# Patient Record
Sex: FEMALE | Race: WHITE | NOT HISPANIC OR LATINO
[De-identification: names, ages, dates, MRNs, and addresses within clinical notes are randomized per-mention and may not be internally consistent; named-entity substitution may affect disease eponyms.]

---

## 2021-10-22 ENCOUNTER — APPOINTMENT (OUTPATIENT)
Dept: BREAST CENTER | Facility: CLINIC | Age: 31
End: 2021-10-22
Payer: MEDICAID

## 2021-10-22 VITALS — BODY MASS INDEX: 19.78 KG/M2 | HEART RATE: 72 BPM | OXYGEN SATURATION: 98 % | HEIGHT: 67 IN | WEIGHT: 126 LBS

## 2021-10-22 DIAGNOSIS — Z78.9 OTHER SPECIFIED HEALTH STATUS: ICD-10-CM

## 2021-10-22 DIAGNOSIS — Z80.3 FAMILY HISTORY OF MALIGNANT NEOPLASM OF BREAST: ICD-10-CM

## 2021-10-22 DIAGNOSIS — Z91.89 OTHER SPECIFIED PERSONAL RISK FACTORS, NOT ELSEWHERE CLASSIFIED: ICD-10-CM

## 2021-10-22 DIAGNOSIS — Z83.3 FAMILY HISTORY OF DIABETES MELLITUS: ICD-10-CM

## 2021-10-22 DIAGNOSIS — Z82.0 FAMILY HISTORY OF EPILEPSY AND OTHER DISEASES OF THE NERVOUS SYSTEM: ICD-10-CM

## 2021-10-22 PROBLEM — Z00.00 ENCOUNTER FOR PREVENTIVE HEALTH EXAMINATION: Status: ACTIVE | Noted: 2021-10-22

## 2021-10-22 PROCEDURE — 99204 OFFICE O/P NEW MOD 45 MIN: CPT

## 2021-10-22 NOTE — HISTORY OF PRESENT ILLNESS
[FreeTextEntry1] : 29 yo female self referred presents for high risk screening and genetic testing. She has not completed any breast imaging. Pt denies palpable masses, skin lesions/changes, nipple discharge, or other breast complaints.\par \par ELAYNE lifetime risk is 37.4%; her mother was diagnosed with breast cancer at age 40 with recurrence at age 60; her maternal grandmother also had breast cancer, dx age unknown. No genetic testing has been completed.

## 2021-10-22 NOTE — PHYSICAL EXAM
[Normocephalic] : normocephalic [Supple] : supple [No Supraclavicular Adenopathy] : no supraclavicular adenopathy [No Thyromegaly] : no thyromegaly [Examined in the supine and seated position] : examined in the supine and seated position [Symmetrical] : symmetrical [No dominant masses] : no dominant masses in right breast  [No dominant masses] : no dominant masses left breast [No Nipple Retraction] : no left nipple retraction [No Nipple Discharge] : no left nipple discharge [No Axillary Lymphadenopathy] : no left axillary lymphadenopathy [No Rashes] : no rashes [No Ulceration] : no ulceration [Breast Nipple Inversion] : nipples not inverted [Breast Nipple Retraction] : nipples not retracted [Breast Nipple Flattening] : nipples not flattened [Breast Nipple Fissures] : nipples not fissured

## 2021-10-22 NOTE — PAST MEDICAL HISTORY
[Menstruating] : The patient is menstruating [Menarche Age ____] : age at menarche was [unfilled] [Definite ___ (Date)] : the last menstrual period was [unfilled] [Total Preg ___] : G[unfilled] [History of Hormone Replacement Treatment] : has no history of hormone replacement treatment [FreeTextEntry5] : None [FreeTextEntry6] : None [FreeTextEntry7] : None [FreeTextEntry8] : No

## 2021-10-22 NOTE — ASSESSMENT
ERROR   [FreeTextEntry1] : 29 yo female self referred presents for high risk screening; she is without complaint, breast exam is WNL. \par \par Discussed with the patient the implications for her lifetime risk, which is considered to be elevated for the development of breast cancer over the span of her lifetime. It was explained that it is recommended that they undergo high risk screening surveillance to include biannual radiological screening exams with a mammogram and screening MRI. Will proceed with baseline imaging now, as her mother was diagnosed with breast cancer at age 40, so she meets criteria per NCCN guidelines. Reviewed the standard lifestyle modifications for risk reduction including to limit alcohol intake, follow a low-fat diet, and maintain a healthy weight.\par \par Discussed self breast exams with the patient. Recommended simple pattern of palpation, while seated and while laying down. Recommended that she performs these breast exams at the same time every month, as to time it with her cycle. Discussed characteristics of a suspicious mass, such as irregular, hard like a rock and fixed/immobile. Patient understands.\par \par Reviewed the benefits of genetic testing in the setting of the patients family history, as well the implications of the various results; insurance coverage and pricing was also explained. It was decided to proceed with testing today ([facility]).

## 2021-11-15 ENCOUNTER — NON-APPOINTMENT (OUTPATIENT)
Age: 31
End: 2021-11-15